# Patient Record
Sex: MALE | Race: BLACK OR AFRICAN AMERICAN | NOT HISPANIC OR LATINO | URBAN - METROPOLITAN AREA
[De-identification: names, ages, dates, MRNs, and addresses within clinical notes are randomized per-mention and may not be internally consistent; named-entity substitution may affect disease eponyms.]

---

## 2018-12-11 ENCOUNTER — OFFICE (OUTPATIENT)
Dept: URBAN - METROPOLITAN AREA CLINIC 12 | Facility: CLINIC | Age: 48
End: 2018-12-11

## 2018-12-11 VITALS
WEIGHT: 160 LBS | HEART RATE: 66 BPM | SYSTOLIC BLOOD PRESSURE: 123 MMHG | HEIGHT: 67 IN | DIASTOLIC BLOOD PRESSURE: 76 MMHG

## 2018-12-11 DIAGNOSIS — B18.2 CHRONIC VIRAL HEPATITIS C: ICD-10-CM

## 2018-12-11 DIAGNOSIS — N18.6 END STAGE RENAL DISEASE: ICD-10-CM

## 2018-12-11 LAB
ACTIN (SMOOTH MUSCLE) ANTIBODY: 24 UNITS — HIGH (ref 0–19)
AFP, SERUM, TUMOR MARKER: 4.3 NG/ML (ref 0–8.3)
ALPHA-1-ANTITRYPSIN PHENOTYP: ALPHA-1-ANTITRYPSIN, SERUM: 134 MG/DL (ref 90–200)
ALPHA-1-ANTITRYPSIN PHENOTYP: PHENOTYPE (PI): (no result)
ANTINUCLEAR ANTIBODIES, IFA: POSITIVE
CBC, PLATELET, NO DIFFERENTIAL: HEMATOCRIT: 33.8 % — LOW (ref 37.5–51)
CBC, PLATELET, NO DIFFERENTIAL: HEMOGLOBIN: 10.8 G/DL — LOW (ref 13–17.7)
CBC, PLATELET, NO DIFFERENTIAL: MCH: 29.4 PG (ref 26.6–33)
CBC, PLATELET, NO DIFFERENTIAL: MCHC: 32 G/DL (ref 31.5–35.7)
CBC, PLATELET, NO DIFFERENTIAL: MCV: 92 FL (ref 79–97)
CBC, PLATELET, NO DIFFERENTIAL: NRBC: 1 % — HIGH (ref 0–0)
CBC, PLATELET, NO DIFFERENTIAL: PLATELETS: 65 X10E3/UL — CRITICAL LOW (ref 150–379)
CBC, PLATELET, NO DIFFERENTIAL: RBC: 3.67 X10E6/UL — LOW (ref 4.14–5.8)
CBC, PLATELET, NO DIFFERENTIAL: RDW: 16.6 % — HIGH (ref 12.3–15.4)
CBC, PLATELET, NO DIFFERENTIAL: WBC: 4.3 X10E3/UL (ref 3.4–10.8)
COMP. METABOLIC PANEL (14): A/G RATIO: 1.5 (ref 1.2–2.2)
COMP. METABOLIC PANEL (14): ALBUMIN: 3.8 G/DL (ref 3.5–5.5)
COMP. METABOLIC PANEL (14): ALKALINE PHOSPHATASE: 105 IU/L (ref 39–117)
COMP. METABOLIC PANEL (14): ALT (SGPT): 64 IU/L — HIGH (ref 0–44)
COMP. METABOLIC PANEL (14): AST (SGOT): 184 IU/L — HIGH (ref 0–40)
COMP. METABOLIC PANEL (14): BILIRUBIN, TOTAL: 0.4 MG/DL (ref 0–1.2)
COMP. METABOLIC PANEL (14): BUN/CREATININE RATIO: 3 — LOW (ref 9–20)
COMP. METABOLIC PANEL (14): BUN: 27 MG/DL — HIGH (ref 6–24)
COMP. METABOLIC PANEL (14): CALCIUM: 9.1 MG/DL (ref 8.7–10.2)
COMP. METABOLIC PANEL (14): CARBON DIOXIDE, TOTAL: 26 MMOL/L (ref 20–29)
COMP. METABOLIC PANEL (14): CHLORIDE: 100 MMOL/L (ref 96–106)
COMP. METABOLIC PANEL (14): CREATININE: 10.48 MG/DL — HIGH (ref 0.76–1.27)
COMP. METABOLIC PANEL (14): EGFR IF AFRICN AM: 6 ML/MIN/1.73 — LOW (ref 59–?)
COMP. METABOLIC PANEL (14): EGFR IF NONAFRICN AM: 5 ML/MIN/1.73 — LOW (ref 59–?)
COMP. METABOLIC PANEL (14): GLOBULIN, TOTAL: 2.6 G/DL (ref 1.5–4.5)
COMP. METABOLIC PANEL (14): GLUCOSE: 78 MG/DL (ref 65–99)
COMP. METABOLIC PANEL (14): POTASSIUM: 4.7 MMOL/L (ref 3.5–5.2)
COMP. METABOLIC PANEL (14): PROTEIN, TOTAL: 6.4 G/DL (ref 6–8.5)
COMP. METABOLIC PANEL (14): SODIUM: 141 MMOL/L (ref 134–144)
FANA STAINING PATTERNS: NOTE: (no result)
FANA STAINING PATTERNS: SPECKLED PATTERN: HIGH
FE+TIBC+FER: FERRITIN, SERUM: 525 NG/ML — HIGH (ref 30–400)
FE+TIBC+FER: IRON BIND.CAP.(TIBC): 282 UG/DL (ref 250–450)
FE+TIBC+FER: IRON SATURATION: 29 % (ref 15–55)
FE+TIBC+FER: IRON: 81 UG/DL (ref 38–169)
FE+TIBC+FER: UIBC: 201 UG/DL (ref 111–343)
HCV FIBROSURE: ALPHA 2-MACROGLOBULINS, QN: 233 MG/DL (ref 110–276)
HCV FIBROSURE: ALT (SGPT) P5P: 79 IU/L — HIGH (ref 0–55)
HCV FIBROSURE: APOLIPOPROTEIN A-1: 150 MG/DL (ref 101–178)
HCV FIBROSURE: BILIRUBIN, TOTAL: 0.3 MG/DL (ref 0–1.2)
HCV FIBROSURE: COMMENT: (no result)
HCV FIBROSURE: FIBROSIS SCORE: 0.28 — HIGH (ref 0–0.21)
HCV FIBROSURE: FIBROSIS SCORING: (no result)
HCV FIBROSURE: FIBROSIS STAGE: (no result)
HCV FIBROSURE: GGT: 55 IU/L (ref 0–65)
HCV FIBROSURE: HAPTOGLOBIN: 86 MG/DL (ref 34–200)
HCV FIBROSURE: INTERPRETATIONS: (no result)
HCV FIBROSURE: LIMITATIONS: (no result)
HCV FIBROSURE: NECROINFLAMM ACTIVITY SCORING: (no result)
HCV FIBROSURE: NECROINFLAMMAT ACTIVITY GRADE: (no result)
HCV FIBROSURE: NECROINFLAMMAT ACTIVITY SCORE: 0.46 — HIGH (ref 0–0.17)
HCV GENOTYPING NON REFLEX: HEPATITIS C GENOTYPE: (no result)
HCV GENOTYPING NON REFLEX: PLEASE NOTE: (no result)
HCV RT-PCR, QUANT (NON-GRAPH): HCV LOG10: 6.17 LOG10 IU/ML
HCV RT-PCR, QUANT (NON-GRAPH): HEPATITIS C QUANTITATION: (no result) IU/ML
HCV RT-PCR, QUANT (NON-GRAPH): TEST INFORMATION: (no result)
HEMATOLOGY COMMENTS: (no result)
HEP A AB, TOTAL: NEGATIVE
HEP B SURFACE AB: HEP B SURFACE AB, QUAL: REACTIVE
PROTHROMBIN TIME (PT): INR: 1.2 (ref 0.8–1.2)
PROTHROMBIN TIME (PT): PROTHROMBIN TIME: 12.3 SEC — HIGH (ref 9.1–12)

## 2018-12-11 PROCEDURE — 99204 OFFICE O/P NEW MOD 45 MIN: CPT | Performed by: INTERNAL MEDICINE

## 2018-12-11 NOTE — SERVICENOTES
The patient has end-stage renal disease as well as chronic hepatitis C. I suspect he likely has if not cirrhosis at least advanced fibrosis given his thrombocytopenia and low albumin though he does have history of end-stage renal disease and so albumin can be low because of this as well. We will go ahead and check blood work as above to further evaluate for other causes of hepatitis and also to determine which genotype his virus is so that we can optimize his treatment management.  I will also try to obtain his old records which appear to be through the [facility] as he states that he thinks he was seen by the transplant clinic at one point.  We will also obtain his most recent colonoscopy from Dr. Garnett.  He was instructed let us know if he has any signs or symptoms of hepatic decompensation the meantime and we discussed the possible side effects of treatment.

## 2019-03-04 ENCOUNTER — OFFICE (OUTPATIENT)
Dept: URBAN - METROPOLITAN AREA CLINIC 12 | Facility: CLINIC | Age: 49
End: 2019-03-04

## 2019-04-18 ENCOUNTER — OFFICE (OUTPATIENT)
Dept: URBAN - METROPOLITAN AREA CLINIC 12 | Facility: CLINIC | Age: 49
End: 2019-04-18

## 2019-08-27 ENCOUNTER — OFFICE (OUTPATIENT)
Dept: URBAN - METROPOLITAN AREA CLINIC 12 | Facility: CLINIC | Age: 49
End: 2019-08-27
Payer: MEDICAID

## 2019-08-27 VITALS
HEART RATE: 71 BPM | SYSTOLIC BLOOD PRESSURE: 146 MMHG | HEIGHT: 67 IN | DIASTOLIC BLOOD PRESSURE: 93 MMHG | WEIGHT: 173 LBS

## 2019-08-27 DIAGNOSIS — B18.2 CHRONIC VIRAL HEPATITIS C: ICD-10-CM

## 2019-08-27 DIAGNOSIS — K74.60 UNSPECIFIED CIRRHOSIS OF LIVER: ICD-10-CM

## 2019-08-27 DIAGNOSIS — N18.6 END STAGE RENAL DISEASE: ICD-10-CM

## 2019-08-27 LAB
AFP, SERUM, TUMOR MARKER: 2.2 NG/ML (ref 0–8.3)
CBC, PLATELET, NO DIFFERENTIAL: HEMATOCRIT: 30 % — LOW (ref 37.5–51)
CBC, PLATELET, NO DIFFERENTIAL: HEMOGLOBIN: 10 G/DL — LOW (ref 13–17.7)
CBC, PLATELET, NO DIFFERENTIAL: MCH: 30.9 PG (ref 26.6–33)
CBC, PLATELET, NO DIFFERENTIAL: MCHC: 33.3 G/DL (ref 31.5–35.7)
CBC, PLATELET, NO DIFFERENTIAL: MCV: 93 FL (ref 79–97)
CBC, PLATELET, NO DIFFERENTIAL: PLATELETS: 83 X10E3/UL — CRITICAL LOW (ref 150–450)
CBC, PLATELET, NO DIFFERENTIAL: RBC: 3.24 X10E6/UL — LOW (ref 4.14–5.8)
CBC, PLATELET, NO DIFFERENTIAL: RDW: 15.7 % — HIGH (ref 12.3–15.4)
CBC, PLATELET, NO DIFFERENTIAL: WBC: 3.8 X10E3/UL (ref 3.4–10.8)
COMP. METABOLIC PANEL (14): A/G RATIO: 1.5 (ref 1.2–2.2)
COMP. METABOLIC PANEL (14): ALBUMIN: 4 G/DL (ref 3.5–5.5)
COMP. METABOLIC PANEL (14): ALKALINE PHOSPHATASE: 166 IU/L — HIGH (ref 39–117)
COMP. METABOLIC PANEL (14): ALT (SGPT): 23 IU/L (ref 0–44)
COMP. METABOLIC PANEL (14): AST (SGOT): 18 IU/L (ref 0–40)
COMP. METABOLIC PANEL (14): BILIRUBIN, TOTAL: 0.2 MG/DL (ref 0–1.2)
COMP. METABOLIC PANEL (14): BUN/CREATININE RATIO: 4 — LOW (ref 9–20)
COMP. METABOLIC PANEL (14): BUN: 84 MG/DL — CRITICAL HIGH (ref 6–24)
COMP. METABOLIC PANEL (14): CALCIUM: 9.1 MG/DL (ref 8.7–10.2)
COMP. METABOLIC PANEL (14): CARBON DIOXIDE, TOTAL: 21 MMOL/L (ref 20–29)
COMP. METABOLIC PANEL (14): CHLORIDE: 97 MMOL/L (ref 96–106)
COMP. METABOLIC PANEL (14): CREATININE: 19.63 MG/DL — CRITICAL HIGH (ref 0.76–1.27)
COMP. METABOLIC PANEL (14): EGFR IF AFRICN AM: 3 ML/MIN/1.73 — LOW (ref 59–?)
COMP. METABOLIC PANEL (14): EGFR IF NONAFRICN AM: 2 ML/MIN/1.73 — LOW (ref 59–?)
COMP. METABOLIC PANEL (14): GLOBULIN, TOTAL: 2.7 G/DL (ref 1.5–4.5)
COMP. METABOLIC PANEL (14): GLUCOSE: 95 MG/DL (ref 65–99)
COMP. METABOLIC PANEL (14): POTASSIUM: 5.1 MMOL/L (ref 3.5–5.2)
COMP. METABOLIC PANEL (14): PROTEIN, TOTAL: 6.7 G/DL (ref 6–8.5)
COMP. METABOLIC PANEL (14): SODIUM: 140 MMOL/L (ref 134–144)
HEMATOLOGY COMMENTS: (no result)
PROTHROMBIN TIME (PT): INR: 1.2 (ref 0.8–1.2)
PROTHROMBIN TIME (PT): PROTHROMBIN TIME: 12 SEC (ref 9.1–12)

## 2019-08-27 PROCEDURE — 99213 OFFICE O/P EST LOW 20 MIN: CPT | Performed by: INTERNAL MEDICINE

## 2019-08-27 NOTE — SERVICENOTES
The patient is now status post treatment of hepatitis C and so we will check the 12 week post treatment viral load to ensure that it is still gone.  The patient does have clinical evidence of cirrhosis with thrombocytopenia and so we will need to check blood work and HCC screening every six months.  We will set him up for EGD at some point for screening for varices.  His next colonoscopy is not due until 2024.

## 2019-08-27 NOTE — SERVICEHPINOTES
Mr. Fermin is a 48-year-old man with chronic kidney disease here for follow up of treatment for hepatitis-C. The patient states that he was diagnosed with this around 2012.  He had liver biopsy at that time with grade 2 inflammation and stage III fibrosis. He is uncertain as to how he contracted the virus but does admit to having transfusions several years ago. He denies any IV drug use, piercings, tattoos, or other blood exposures. He states he will rarely have alcohol and does not have a history of drinking heavily. He does not smoke. He did use to smoke on a regular basis. He does have end-stage renal disease and has been on dialysis for 23 years. He was referred here by the [facility] as recent blood work did reveal that he was positive for hepatitis C virus. Other blood work at that time revealed normal liver enzymes. Albumin was low. CBC revealed mild anemia with hematocrit 33 and platelet count was 80079. His last colonoscopy was in 2014 with only some mild erythema with negative biopsies.  No polyps were found.Interim History:Mable initially saw the patient in December 2018. At that time basic blood work did reveal some mild elevation of liver enzymes and some mild anemia with some thrombocytopenia [place] of [address]. He was hepatitis C genotype one a and had otherwise negative AFP and viral hepatitis labs. He is immune to hepatitis B. he did have a positive ARTI of 1:160 and minimally elevated smooth muscle antibody. Ultrasound revealed some gallstones but was otherwise unremarkable for any liver lesions. Given his clinical diagnosis of cirrhosis he did undergo treatment with Mavyret for a 12 week course, which he completed in May 2019. He tolerated it well and not comes back in for follow-up. He denies any fevers, chills, nausea, vomiting, abdominal pain, diarrhea, constipation, or rectal bleeding.

## 2019-11-05 ENCOUNTER — AMBULATORY SURGICAL CENTER (OUTPATIENT)
Dept: URBAN - METROPOLITAN AREA SURGERY 2 | Facility: SURGERY | Age: 49
End: 2019-11-05

## 2021-12-28 ENCOUNTER — OFFICE (OUTPATIENT)
Dept: URBAN - METROPOLITAN AREA CLINIC 12 | Facility: CLINIC | Age: 51
End: 2021-12-28
Payer: MEDICAID

## 2021-12-28 VITALS
SYSTOLIC BLOOD PRESSURE: 152 MMHG | HEART RATE: 92 BPM | OXYGEN SATURATION: 98 % | HEIGHT: 67 IN | DIASTOLIC BLOOD PRESSURE: 92 MMHG | WEIGHT: 158 LBS

## 2021-12-28 DIAGNOSIS — K74.60 UNSPECIFIED CIRRHOSIS OF LIVER: ICD-10-CM

## 2021-12-28 DIAGNOSIS — R10.9 UNSPECIFIED ABDOMINAL PAIN: ICD-10-CM

## 2021-12-28 DIAGNOSIS — B18.2 CHRONIC VIRAL HEPATITIS C: ICD-10-CM

## 2021-12-28 DIAGNOSIS — N18.6 END STAGE RENAL DISEASE: ICD-10-CM

## 2021-12-28 LAB
AFP, SERUM, TUMOR MARKER: 2.8 NG/ML (ref 0–8.3)
CBC, PLATELET, NO DIFFERENTIAL: HEMATOCRIT: 32.1 % — LOW (ref 37.5–51)
CBC, PLATELET, NO DIFFERENTIAL: HEMOGLOBIN: 10.1 G/DL — LOW (ref 13–17.7)
CBC, PLATELET, NO DIFFERENTIAL: MCH: 29.6 PG (ref 26.6–33)
CBC, PLATELET, NO DIFFERENTIAL: MCHC: 31.5 G/DL (ref 31.5–35.7)
CBC, PLATELET, NO DIFFERENTIAL: MCV: 94 FL (ref 79–97)
CBC, PLATELET, NO DIFFERENTIAL: PLATELETS: 68 X10E3/UL — CRITICAL LOW (ref 150–450)
CBC, PLATELET, NO DIFFERENTIAL: RBC: 3.41 X10E6/UL — LOW (ref 4.14–5.8)
CBC, PLATELET, NO DIFFERENTIAL: RDW: 14.7 % (ref 11.6–15.4)
CBC, PLATELET, NO DIFFERENTIAL: WBC: 4 X10E3/UL (ref 3.4–10.8)
COMP. METABOLIC PANEL (14): A/G RATIO: 1.6 (ref 1.2–2.2)
COMP. METABOLIC PANEL (14): ALBUMIN: 4.1 G/DL (ref 3.8–4.9)
COMP. METABOLIC PANEL (14): ALKALINE PHOSPHATASE: 116 IU/L (ref 44–121)
COMP. METABOLIC PANEL (14): ALT (SGPT): 18 IU/L (ref 0–44)
COMP. METABOLIC PANEL (14): AST (SGOT): 24 IU/L (ref 0–40)
COMP. METABOLIC PANEL (14): BILIRUBIN, TOTAL: 0.4 MG/DL (ref 0–1.2)
COMP. METABOLIC PANEL (14): BUN/CREATININE RATIO: 4 — LOW (ref 9–20)
COMP. METABOLIC PANEL (14): BUN: 48 MG/DL — HIGH (ref 6–24)
COMP. METABOLIC PANEL (14): CALCIUM: 9.2 MG/DL (ref 8.7–10.2)
COMP. METABOLIC PANEL (14): CARBON DIOXIDE, TOTAL: 24 MMOL/L (ref 20–29)
COMP. METABOLIC PANEL (14): CHLORIDE: 100 MMOL/L (ref 96–106)
COMP. METABOLIC PANEL (14): CREATININE: 10.94 MG/DL — HIGH (ref 0.76–1.27)
COMP. METABOLIC PANEL (14): EGFR IF AFRICN AM: 6 ML/MIN/1.73 — LOW (ref 59–?)
COMP. METABOLIC PANEL (14): EGFR IF NONAFRICN AM: 5 ML/MIN/1.73 — LOW (ref 59–?)
COMP. METABOLIC PANEL (14): GLOBULIN, TOTAL: 2.5 G/DL (ref 1.5–4.5)
COMP. METABOLIC PANEL (14): GLUCOSE: 86 MG/DL (ref 65–99)
COMP. METABOLIC PANEL (14): POTASSIUM: 5.1 MMOL/L (ref 3.5–5.2)
COMP. METABOLIC PANEL (14): PROTEIN, TOTAL: 6.6 G/DL (ref 6–8.5)
COMP. METABOLIC PANEL (14): SODIUM: 140 MMOL/L (ref 134–144)
HCV RT-PCR, QUANT (NON-GRAPH): HCV LOG10: (no result)
HCV RT-PCR, QUANT (NON-GRAPH): HEPATITIS C QUANTITATION: (no result) IU/ML
HCV RT-PCR, QUANT (NON-GRAPH): TEST INFORMATION: (no result)
HEMATOLOGY COMMENTS: (no result)
PROTHROMBIN TIME (PT): INR: 1.2 (ref 0.9–1.2)
PROTHROMBIN TIME (PT): PROTHROMBIN TIME: 12.2 SEC — HIGH (ref 9.1–12)

## 2021-12-28 PROCEDURE — 99214 OFFICE O/P EST MOD 30 MIN: CPT

## 2021-12-28 NOTE — SERVICEHPINOTES
Mr. Fermin is a 51 yr old male who presents today for new onset of right sides abdominal pain. He states this pain started one month ago and describes it as feeling "like a sore muscle". He notices the discomfort daily but eating take-out food makes the discomfort worse. He does have a history of HCV, cirrhosis and gallstones present on Ab. US from 2019. He was treated with Mavyret for HCV in May 2019. He has been lost to follow-up since 2019. He had an EGD ordered in 2019 but never completed the procedure.  He currently denies any fever, chills, nausea, vomiting, diarrhea, constipation or rectal bleeding. He will occasionally have heartburn but states this is related to his diet. He will have one BM a day with no straining or pain. He had one episode of rectal bleeding three weeks ago but states it was related to something that he had eaten that day. He has noticed a bulge for the last 3 months in his umbilical region which fluctuates in size depending on the amount of food he has eaten that day. He is due for his next colonoscopy in 2024. margaret robles PMHX:Cody initially saw the patient in December 2018. At that time basic blood work did reveal some mild elevation of liver enzymes and some mild anemia with some thrombocyt[place] [address]. He was hepatitis C genotype one a and had otherwise negative AFP and viral hepatitis labs. He is immune to hepatitis B. he did have a positive ARTI of 1:160 and minimally elevated smooth muscle antibody. Ultrasound revealed some gallstones but was otherwise unremarkable for any liver lesions. Given his clinical diagnosis of cirrhosis he did undergo treatment with Mavyret for a 12 week course, which he completed in May 2019.  The patient states that he was diagnosed with this around 2012. He had liver biopsy at that time with grade 2 inflammation and stage III fibrosis. He is uncertain as to how he contracted the virus but does admit to having transfusions several years ago. He denies any IV drug use, piercings, tattoos, or other blood exposures. He states he will rarely have alcohol and does not have a history of drinking heavily. He does not smoke. He did use to smoke on a regular basis. He does have end-stage renal disease and has been on dialysis for 23 years. He was referred here by the [facility] as recent blood work did reveal that he was positive for hepatitis C virus. Other blood work at that time revealed normal liver enzymes. Albumin was low. CBC revealed mild anemia with hematocrit 33 and platelet count was 47816. His last colonoscopy was in 2014 with only some mild erythema with negative biopsies. No polyps were found.

## 2021-12-28 NOTE — SERVICENOTES
MD Addendum: The patient was seen along with Miladys Khan NP.  I have evaluated the patient, discussed with NP, and agree with the above documented findings, impression, and plan of care. The patient is status post therapy for hepatitis-C but does have cirrhosis.  We will confirm that his hepatitis C has achieved cure as he missed his follow-up appointment for this.  He does note some right upper quadrant discomfort but this does radiate down into his right leg which is somewhat atypical for gallbladder.  He does have a history of gallstones on previous ultrasounds.  We will recheck ultrasound today to evaluate his gallbladder as well as for screening of his liver for HCC.  We will also check HIDA scan.  The patient would like to be more aggressive on dietary modifications including cutting back on dairy and fatty food.  We can consider trial of an antispasmodic as needed.  He is also overdue for EGD varices screening which can also evaluate for his abdominal pain as well. He states that he is scheduled to see his kidney transplant doctor for possible evaluation.  I did recommend that he discuss with him about possible dual kidney and liver transplant.  I did explain to him that given his cirrhosis he would be at increased risk for surgical complications though he does appear to have more mild disease. If he continues to have more persistent symptoms we may need to consider referral to surgeon but the patient would have to be understanding that this could lead to some worsening of his liver disease.-SUZIE